# Patient Record
Sex: FEMALE | Race: WHITE | HISPANIC OR LATINO | Employment: STUDENT | URBAN - NONMETROPOLITAN AREA
[De-identification: names, ages, dates, MRNs, and addresses within clinical notes are randomized per-mention and may not be internally consistent; named-entity substitution may affect disease eponyms.]

---

## 2020-08-26 PROCEDURE — U0002 COVID-19 LAB TEST NON-CDC: HCPCS | Performed by: NURSE PRACTITIONER

## 2020-08-26 PROCEDURE — U0004 COV-19 TEST NON-CDC HGH THRU: HCPCS | Performed by: NURSE PRACTITIONER

## 2024-07-22 NOTE — PROGRESS NOTES
"    New Patient Office Visit      Date: 2024  Patient Name: Grace Whittaker  : 2000   MRN: [id number]     Referring Provider: Deena Paulino DO    Chief Complaint:      ICD-10-CM ICD-9-CM   1. ADHD (attention deficit hyperactivity disorder), inattentive type  F90.0 314.00        History of Present Illness:   Grace Whittaker is a 23 y.o. female who is here today to establish care with a psychiatric provider, at the recommendation of her primary care provider.  She states, \"I want to get back on my ADHD medication that I was on when I was a kid.  I guess it works?\"  She was diagnosed with ADHD when she was around 5 years old, and was on medication until middle school; she reports that the medication tasted bad, which was her primary reason for discontinuing it.  She notes that her schoolwork did improve during that time, but she has a poor recollection of her childhood otherwise.  She reports that her father left when she was young, and her mother remained emotionally unavailable, though provided for her daughter's physical needs.  She denies any bullying or physical/sexual trauma, but has had a difficult time for most of her life with interpersonal relationships.  She made it through high school and her bachelor's degree without ADHD medication, but thinks the structure of the educational system worked well for her.  She received accommodations for her ADHD and tutoring, and did fairly well with significant effort.  She did experience increased stress, especially during times or more focus/attention was required, and often had physical reactions to stress in her body; she reports that most semesters, during the weeks leading up to her final examinations, she would miss her period.  Now that she is out of school, she has had a very difficult time establishing herself.  She still has not found a job, and gets very discouraged about this, often feeling like a failure.  She reports low energy, " low motivation, low self-esteem, feelings of hopelessness/worthlessness, and irritability.  She often has vivid dreams, and has frequently used sleep and escape into dreams as her coping mechanism for stress.  Other symptoms include difficulty focusing, procrastination, executive dysfunction, difficulty completing tasks, and periods of hyper fixation. No SI/HI/psychotic/manic symptoms present, no adverse effects or side effects to current medications noted, and no issues with appetite or sleep reported.    Subjective      Review of Systems:   Review of Systems   Psychiatric/Behavioral:  Positive for decreased concentration, sleep disturbance, depressed mood and stress. The patient is nervous/anxious.        Screening Scores:   PHQ-9 : 14  DAVID-7 : 9  PTSD: 31  MDQ: 8  ASRS-V1.1: positive, inattentive    Past Psychiatric History:  History of outpatient psychiatrist: not sure  History of outpatient therapy: no  Previous Inpatient hospitalizations: no  Previous diagnoses: diagnosed with ADHD around age 5  Previous medication trials: ritalin   History of suicide attempts: no  History of self harming behaviors: no     Abuse/trauma History:              Physical: no              Sexual: no              Emotional/Neglect: mother not emotionally available              Death/loss of relationship: dad left when she was young              Other trauma: no                Substance Abuse History:              Alcohol: no              Tobacco/Vape: no              Illicit Drugs: no  Marijuana/THC: no  Hallucinogens: no                Legal History:  The patient has no significant history of legal issues.     Social History:  Where was patient born: [place]  Where does patient currently live: [place]  Describe living situation: live with her mom   Notes: Dad left when she was young  Pets: cat  Highest level of education obtained: bachelors in computer science  Patient's occupation: unemployed, looking for a job in [employer]  analysis  Leisure and recreation: video games, 3D modeling  Support system: don't have anyone I feel like I can talk to; don't make many friends  Confucianism practices: grew up [place], doesn't want to tell her mom she doesn't believe anymore     Family History:  Family History   Problem Relation Age of Onset    Hyperlipidemia Mother     ADD / ADHD Father     Depression Father         N/A    Anxiety disorder Sister     Depression Sister         Had has suicidal thoughts, and struggle with it.    Self-Injurious Behavior  Sister         Would out right hurt herseft, but as a kid she would draw on herself to coup. With here depression and want to harm herself. Also it may have been to have more power or control. (her words not mine)       Family Psychiatric History:   Psych diagnoses: dad had depression and ADHD, sister has depression  Suicide/self harm attempts: no  Substance abuse: Dad's side of family, maternal grandmother     Patient Medical History:  Are there any significant health issues (current or past): no  History of seizures: febrile seizure when she was a baby   History of head injuries: no  History of cardiac issues: no  Medications/supplements: no    Past Medical History:   Diagnosis Date    ADHD (attention deficit hyperactivity disorder) 2001    I hyperfocused as a toddler mother though I was deaf. Didn't start meds until the sec grade, and got off around middle school. At the time I didn't like the bitter pills.    Asthma     Autism spectrum disorder     Could have it.    Depression     Psoriasis     Seizures 2 years old    Cause most likely virus. Never happened again.       Past Surgical History:   Past Surgical History:   Procedure Laterality Date    WISDOM TOOTH EXTRACTION         Medications:     Current Outpatient Medications:     albuterol sulfate  (90 Base) MCG/ACT inhaler, Inhale 2 puffs Every 4 (Four) Hours As Needed for Wheezing or Shortness of Air., Disp: , Rfl:     cholecalciferol  "(Vitamin D) 25 MCG (1000 UT) tablet, Take 1 tablet by mouth Daily., Disp: , Rfl:     Probiotic Product (UP4 PROBIOTICS PO), Take  by mouth., Disp: , Rfl:     methylphenidate (Ritalin) 10 MG tablet, Take 1 tablet by mouth 2 (Two) Times a Day., Disp: 60 tablet, Rfl: 0    Medication Considerations:  ANTHONY reviewed and appropriate.      Allergies:   No Known Allergies    Objective   Vital Signs:   Vitals:    07/23/24 0826   BP: 114/70   Weight: 61.3 kg (135 lb 3.2 oz)   Height: 157.5 cm (62\")     Body mass index is 24.73 kg/m².     Mental Status Exam:   MENTAL STATUS EXAM   General Appearance:  Cleanly groomed and dressed  Eye Contact:  Fair  Attitude:  Guarded and cooperative  Motor Activity:  Normal gait, posture, fidgety and foot tapping  Muscle Strength:  Normal  Speech:  Normal rate, tone, volume and soft spoken  Language:  Hesitant  Mood and affect:  Normal, pleasant, euthymic and depressed  Hopelessness:  2  Loneliness: 2  Thought Process:  Goal-directed and logical  Associations/ Thought Content:  No delusions  Hallucinations:  None  Suicidal Ideations:  Not present  Homicidal Ideation:  Not present  Sensorium:  Alert  Orientation:  Person, place, time and situation  Immediate Recall, Recent, and Remote Memory:  Intact  Attention Span/ Concentration:  Easily distracted  Fund of Knowledge:  Appropriate for age and educational level  Intellectual Functioning:  Average range  Insight:  Good  Judgement:  Good  Reliability:  Good  Impulse Control:  Good       SUICIDE RISK ASSESSMENT/CSSRS:  1. Does patient have thoughts of suicide? no  2. Does patient have intent for suicide? no  3. Does patient have a current plan for suicide? no  4. History of suicide attempts: no  5. Family history of suicide or attempts: no  6. History of violent behaviors towards others or property or thoughts of committing suicide: no  7. History of sexual aggression toward others: no  8. Access to firearms or weapons: no    Labs Reviewed: " n/a  UDS Reviewed: n/a  Chart Reviewed: yes    Assessment / Plan      Quality Measures:   Tobacco cessation: Patient denies tobacco use. No tobacco cessation education necessary.     Depression (PHQ >9): Patient screened positive for depression with a PHQ score of 14. Follow up recommendations include medication management, suicide risk assessment, continued screening score monitoring and supportive care.    Medication Considerations:  Benzo: n/a  Stimulants: CSA up to date and on file   ANTHONY reviewed and appropriate.     Safety: No acute safety concerns    Risk Assessment: Risk of self-harm acutely is low. Risk of self-harm chronically is also low, but could be further elevated in the event of treatment noncompliance and/or AODA.    Visit Diagnosis/Orders Placed This Visit:  Diagnoses and all orders for this visit:    1. ADHD (attention deficit hyperactivity disorder), inattentive type (Primary)  -     methylphenidate (Ritalin) 10 MG tablet; Take 1 tablet by mouth 2 (Two) Times a Day.  Dispense: 60 tablet; Refill: 0         Impression/Formulation:  Patient appeared alert and oriented.  Patient is voluntarily seeking psychiatric care at [facility].  Patient is receptive to assistance with maintaining a stable lifestyle.  Patient presents with history of     ICD-10-CM ICD-9-CM   1. ADHD (attention deficit hyperactivity disorder), inattentive type  F90.0 314.00   .     Treatment Plan:   Start Ritalin 10 mg twice daily for ADHD.  Discussed book recommendations, establishing care with a local therapist, and nonpharmacologic interventions for anxiety. Follow up in 4 weeks.    Any medications prescribed have been sent electronically to [facility] in [place].    Patient will continue supportive psychotherapy efforts and medications as indicated. Discussed medication options and treatment plan of prescribed medication(s) as well as the risks, benefits, and potential side effects. Patient ackowledged  and verbally consented to continue with current treatment plan and was educated on the importance of compliance with treatment and follow-up appointments. Patient seems reasonably able to adhere to treatment plan.      Assisted Patient in identifying risk factors which would indicate the need for higher level of care including thoughts to harm self or others and/or self-harming behavior and encouraged Patient to contact this office, call 911, or present to the nearest emergency room should any of these events occur. Discussed crisis intervention services and means to access. Clinic will obtain release of information for current treatment team for continuity of care as needed. Patient adamantly and convincingly denies current suicidal or homicidal ideation or perceptual disturbance.     Follow Up:   Return in about 4 weeks (around 8/20/2024) for Medication Management.        ANANYA Cruz  [facility]    This is electronically signed by ANANYA Cruz  07/23/2024 12:54 EDT

## 2024-07-23 ENCOUNTER — OFFICE VISIT (OUTPATIENT)
Dept: BEHAVIORAL HEALTH | Facility: CLINIC | Age: 24
End: 2024-07-23
Payer: COMMERCIAL

## 2024-07-23 VITALS
HEIGHT: 62 IN | BODY MASS INDEX: 24.88 KG/M2 | SYSTOLIC BLOOD PRESSURE: 114 MMHG | DIASTOLIC BLOOD PRESSURE: 70 MMHG | WEIGHT: 135.2 LBS

## 2024-07-23 DIAGNOSIS — F90.0 ADHD (ATTENTION DEFICIT HYPERACTIVITY DISORDER), INATTENTIVE TYPE: Primary | ICD-10-CM

## 2024-07-23 PROCEDURE — 90792 PSYCH DIAG EVAL W/MED SRVCS: CPT

## 2024-07-23 RX ORDER — CHOLECALCIFEROL (VITAMIN D3) 25 MCG
1000 TABLET ORAL DAILY
COMMUNITY

## 2024-07-23 RX ORDER — METHYLPHENIDATE HYDROCHLORIDE 10 MG/1
10 TABLET ORAL 2 TIMES DAILY
Qty: 60 TABLET | Refills: 0 | Status: SHIPPED | OUTPATIENT
Start: 2024-07-23

## 2024-07-23 NOTE — PATIENT INSTRUCTIONS
[email or web address]: online therapist directory    Juni recommendations:  Fany and [employer]opla: free library access, including audiobooks and e-books  I Am: affirmations  Balance: mindfulness and meditation    Bilateral stimulation music: free on youtube and spotify    Book Recommendations:  The Radical Woman's Guide to ADHD  You Mean I'm Not Crazy, Lazy, Stupid or Crazy? The Classic Self-Help Book for Adults with ADHD, Fariha  How To Do The Work, Mary Hernández (follow The Holistic Psychologist)  Adult Children of Emotionally Immature Parents, Charla Bolanos

## 2024-07-24 PROBLEM — F90.0 ADHD (ATTENTION DEFICIT HYPERACTIVITY DISORDER), INATTENTIVE TYPE: Status: ACTIVE | Noted: 2024-07-24

## 2024-08-22 ENCOUNTER — OFFICE VISIT (OUTPATIENT)
Dept: BEHAVIORAL HEALTH | Facility: CLINIC | Age: 24
End: 2024-08-22
Payer: COMMERCIAL

## 2024-08-22 VITALS
SYSTOLIC BLOOD PRESSURE: 122 MMHG | BODY MASS INDEX: 24.88 KG/M2 | HEIGHT: 62 IN | WEIGHT: 135.2 LBS | DIASTOLIC BLOOD PRESSURE: 76 MMHG

## 2024-08-22 DIAGNOSIS — F90.0 ADHD (ATTENTION DEFICIT HYPERACTIVITY DISORDER), INATTENTIVE TYPE: Primary | ICD-10-CM

## 2024-08-22 DIAGNOSIS — Z51.81 THERAPEUTIC DRUG MONITORING: ICD-10-CM

## 2024-08-22 RX ORDER — DEXTROAMPHETAMINE SACCHARATE, AMPHETAMINE ASPARTATE, DEXTROAMPHETAMINE SULFATE AND AMPHETAMINE SULFATE 2.5; 2.5; 2.5; 2.5 MG/1; MG/1; MG/1; MG/1
10 TABLET ORAL 2 TIMES DAILY
Qty: 60 TABLET | Refills: 0 | Status: SHIPPED | OUTPATIENT
Start: 2024-08-22

## 2024-08-22 NOTE — PROGRESS NOTES
Follow Up Office Visit      Date: 2024   Patient Name: Grace Whittaker  : 2000   MRN: [id number]     Referring Provider: Deena Paulino DO    Chief Complaint:      ICD-10-CM ICD-9-CM   1. ADHD (attention deficit hyperactivity disorder), inattentive type  F90.0 314.00   2. Therapeutic drug monitoring  Z51.81 V58.83        History of Present Illness:   Grace Whittaker is a 23 y.o. female who is here today for follow up with medication management for ADHD.  She reports that since starting the Ritalin, she feels more energetic, more motivated; she has been able to clean her space, and making decisions and completing tasks has been easier.  However, she also reports feeling shaky and anxious, slightly more fidgety.  She has been taking her medications every 6 hours, and has been pretty consistent, only missing 1 day.  Though she wants to continue treatment for ADHD, she does not want to continue on this particular dose.  No SI/HI/psychotic/manic symptoms present, no adverse effects or side effects to current medications noted, and no issues with appetite or sleep reported.    Answers submitted by the patient for this visit:  Other (Submitted on 8/15/2024)  Please describe your symptoms.: Prescription stuff; When I'm on it I get shaky, also feel anxious. Still do get distracted. But overall it's been an improvement I noticeable one. Also gotten three headaches but it could be because of my menstrual cycle or whatever is causing my rash I don't know. Also sister notice I'm shaky and need to move or do something with my hands more. She also thinks I maybe anxious.  Have you had these symptoms before?: No  How long have you been having these symptoms?: 1-2 weeks  Please list any medications you are currently taking for this condition: Ritalin  Please describe any probable cause for these symptoms: Ritalin, I have found to be vitamin d deficient. I'm working on it there should be an  "improvement (unless am wrong about sunlight). Also my periods just begin weird I am seeing medical professionals about it.  Primary Reason for Visit (Submitted on 8/15/2024)  What is the primary reason for your visit?: Other    Subjective     Review of Systems:   Review of Systems   Psychiatric/Behavioral:  Positive for decreased concentration. The patient is nervous/anxious.        Screening Scores:   PHQ-9 : 9 (last visit, 14)  DAVID-7 : 6 (last visit, 9)    Medications:     Current Outpatient Medications:     albuterol sulfate  (90 Base) MCG/ACT inhaler, Inhale 2 puffs Every 4 (Four) Hours As Needed for Wheezing or Shortness of Air., Disp: , Rfl:     cholecalciferol (Vitamin D) 25 MCG (1000 UT) tablet, Take 1 tablet by mouth Daily., Disp: , Rfl:     Probiotic Product (UP4 PROBIOTICS PO), Take  by mouth., Disp: , Rfl:     amphetamine-dextroamphetamine (Adderall) 10 MG tablet, Take 1 tablet by mouth 2 (Two) Times a Day., Disp: 60 tablet, Rfl: 0    Allergies:   No Known Allergies    Results Reviewed: n/a     The following portion of the patient's history were reviewed and updated appropriately: allergies, current and past medications, family history, medical history and social history.    Objective     Vital Signs:   Vitals:    08/22/24 0830   BP: 122/76   Weight: 61.3 kg (135 lb 3.2 oz)   Height: 157.5 cm (62\")     Body mass index is 24.73 kg/m².     Mental Status Exam:   MENTAL STATUS EXAM   General Appearance:  Cleanly groomed and dressed  Eye Contact:  Good eye contact  Attitude:  Cooperative  Motor Activity:  Normal gait, posture and fidgety  Muscle Strength:  Normal  Speech:  Normal rate, tone, volume  Language:  Spontaneous  Mood and affect:  Normal, pleasant and anxious  Hopelessness:  Denies  Loneliness: Denies  Thought Process:  Logical  Associations/ Thought Content:  No delusions  Hallucinations:  None  Suicidal Ideations:  Not present  Homicidal Ideation:  Not present  Sensorium:  Alert and " clear  Orientation:  Person, place, situation and time  Immediate Recall, Recent, and Remote Memory:  Intact  Attention Span/ Concentration:  Easily distracted  Fund of Knowledge:  Appropriate for age and educational level  Intellectual Functioning:  Average range  Insight:  Good  Judgement:  Good  Reliability:  Good  Impulse Control:  Good        SUICIDE RISK ASSESSMENT/CSSRS:  1. Does patient have thoughts of suicide? no  2. Does patient have intent for suicide? no  3. Does patient have a current plan for suicide? no  4. History of suicide attempts: no  5. Family history of suicide or attempts: no  6. History of violent behaviors towards others or property or thoughts of committing suicide: no  7. History of sexual aggression toward others: no  8. Access to firearms or weapons: no    Labs Reviewed: n/a  UDS Reviewed: ordered today  Chart since last visit reviewed: yes    Assessment / Plan    Quality Measures:  Tobacco cessation: Patient denies tobacco use. No tobacco cessation education necessary.    Depression (PHQ >9): Patient screened positive for depression with a PHQ score of 9. Follow up recommendations include medication management, suicide risk assessment, continued screening score monitoring and supportive care.    Medication Considerations:  Benzo: n/a  Stimulants: CSA up to date and on file   ANTHONY reviewed and appropriate.     Risk Assessment: Risk of self-harm acutely is low. Risk of self-harm chronically is also low, but could be further elevated in the event of treatment noncompliance and/or AODA.    Visit Diagnosis/Orders Placed This Visit:  Diagnoses and all orders for this visit:    1. ADHD (attention deficit hyperactivity disorder), inattentive type (Primary)  -     amphetamine-dextroamphetamine (Adderall) 10 MG tablet; Take 1 tablet by mouth 2 (Two) Times a Day.  Dispense: 60 tablet; Refill: 0    2. Therapeutic drug monitoring  -     Compliance Drug Analysis, Ur - Urine, Clean Catch          Impression/Formulation:  Patient appeared alert and oriented.  Patient is voluntarily continuing to receive psychiatric care at [facility].   Patient is receptive to assistance with maintaining a stable lifestyle.  Patient presents with history of     ICD-10-CM ICD-9-CM   1. ADHD (attention deficit hyperactivity disorder), inattentive type  F90.0 314.00   2. Therapeutic drug monitoring  Z51.81 V58.83   .     Treatment Plan:   Patient has some Ritalin left; advised her to take 5 mg twice daily to see if shaking/anxiety improves. Call if she wishes to continue Ritalin 5 mg twice daily. If not, discontinue Ritalin, start Adderall 10 mg twice daily. Advised patient to take 5 mg twice daily for the first two or three days, then increase to 10 mg. Follow up in 4 weeks.    Any medications prescribed have been sent electronically to [facility] in [place].     Patient will continue supportive psychotherapy efforts and medications as indicated.  Discussed medication options and treatment plan of prescribed medication(s) as well as the risks, benefits, and potential side effects. Patient will contact this office, call 911 or present to the nearest emergency room should suicidal or homicidal ideations occur. Clinic will obtain release of information for current treatment team for continuity of care as needed. Patient ackowledged and verbally consented to continue with current treatment plan and was educated on the importance of compliance with treatment and follow-up appointments.     Follow Up:   Return in about 4 weeks (around 9/19/2024) for Medication Management.        ANANYA Perrin  [facility]    This is electronically signed by ANANYA Perrin  08/22/2024 08:04 EDT

## 2024-08-30 LAB — DRUGS UR: NORMAL

## 2024-09-16 ENCOUNTER — TELEMEDICINE (OUTPATIENT)
Age: 24
End: 2024-09-16
Payer: COMMERCIAL

## 2024-09-16 DIAGNOSIS — F90.0 ADHD (ATTENTION DEFICIT HYPERACTIVITY DISORDER), INATTENTIVE TYPE: Primary | ICD-10-CM

## 2024-09-16 PROCEDURE — 99214 OFFICE O/P EST MOD 30 MIN: CPT

## 2024-09-16 RX ORDER — LISDEXAMFETAMINE DIMESYLATE 20 MG/1
20 CAPSULE ORAL EVERY MORNING
Qty: 30 CAPSULE | Refills: 0 | Status: SHIPPED | OUTPATIENT
Start: 2024-09-16

## 2024-09-20 ENCOUNTER — TELEPHONE (OUTPATIENT)
Dept: INTERNAL MEDICINE | Facility: CLINIC | Age: 24
End: 2024-09-20
Payer: COMMERCIAL

## 2024-09-20 DIAGNOSIS — F90.0 ADHD (ATTENTION DEFICIT HYPERACTIVITY DISORDER), INATTENTIVE TYPE: Primary | ICD-10-CM

## 2024-09-24 RX ORDER — DEXTROAMPHETAMINE SACCHARATE, AMPHETAMINE ASPARTATE, DEXTROAMPHETAMINE SULFATE AND AMPHETAMINE SULFATE 2.5; 2.5; 2.5; 2.5 MG/1; MG/1; MG/1; MG/1
10 TABLET ORAL 2 TIMES DAILY
Qty: 40 TABLET | Refills: 0 | Status: SHIPPED | OUTPATIENT
Start: 2024-09-24 | End: 2024-10-14

## 2024-10-02 PROBLEM — J02.9 SORE THROAT: Status: ACTIVE | Noted: 2024-10-02

## 2024-10-11 ENCOUNTER — OFFICE VISIT (OUTPATIENT)
Dept: INTERNAL MEDICINE | Facility: CLINIC | Age: 24
End: 2024-10-11
Payer: COMMERCIAL

## 2024-10-11 VITALS
BODY MASS INDEX: 23.92 KG/M2 | SYSTOLIC BLOOD PRESSURE: 128 MMHG | TEMPERATURE: 99 F | HEIGHT: 63 IN | DIASTOLIC BLOOD PRESSURE: 74 MMHG | OXYGEN SATURATION: 98 % | HEART RATE: 88 BPM | WEIGHT: 135 LBS

## 2024-10-11 DIAGNOSIS — Z13.0 SCREENING FOR DISORDER OF BLOOD AND BLOOD-FORMING ORGANS: ICD-10-CM

## 2024-10-11 DIAGNOSIS — R25.1 TREMOR: ICD-10-CM

## 2024-10-11 DIAGNOSIS — Z13.228 SCREENING FOR ENDOCRINE, METABOLIC AND IMMUNITY DISORDER: ICD-10-CM

## 2024-10-11 DIAGNOSIS — Z13.0 SCREENING FOR ENDOCRINE, METABOLIC AND IMMUNITY DISORDER: ICD-10-CM

## 2024-10-11 DIAGNOSIS — Z13.1 SCREENING FOR DIABETES MELLITUS: ICD-10-CM

## 2024-10-11 DIAGNOSIS — R53.83 OTHER FATIGUE: ICD-10-CM

## 2024-10-11 DIAGNOSIS — Z83.3 FAMILY HISTORY OF DIABETES MELLITUS: ICD-10-CM

## 2024-10-11 DIAGNOSIS — R59.0 ENLARGED LYMPH NODE IN NECK: ICD-10-CM

## 2024-10-11 DIAGNOSIS — E73.9 LACTOSE INTOLERANCE: ICD-10-CM

## 2024-10-11 DIAGNOSIS — R21 RASH: ICD-10-CM

## 2024-10-11 DIAGNOSIS — Z76.89 ENCOUNTER TO ESTABLISH CARE: Primary | ICD-10-CM

## 2024-10-11 DIAGNOSIS — J02.9 SORE THROAT: ICD-10-CM

## 2024-10-11 DIAGNOSIS — Z13.29 SCREENING FOR ENDOCRINE, METABOLIC AND IMMUNITY DISORDER: ICD-10-CM

## 2024-10-11 PROCEDURE — 99213 OFFICE O/P EST LOW 20 MIN: CPT | Performed by: NURSE PRACTITIONER

## 2024-10-11 NOTE — PROGRESS NOTES
Date: 10/11/2024    Name: Grace Whittaker  : 2000    Chief Complaint:   Chief Complaint   Patient presents with    [facility]     Recent strep infection 10/01/08189       HPI:   is a 23 y.o. female presents to establish care. [facility]    History of Present Illness  Grace Whittaker is a 23-year-old female who presents to [facility]. Was going to [facility] for acute issues.  Last saw her primary care provider in [facility] a few years ago. She had blood work done at [facility] due to a rash, which has since improved. The rash appears in areas where she scratches but can also appear spontaneously. It consists of red dots that generally do not itch, except in areas where she has been bitten by an insect. Her mother suggested it might be damaged cells above the skin, but she is unsure. She has noticed a bump on her left chest that feels unusual but is unsure of its cause. Has been tested for psoriasis.    Endorse occasional shakiness, which seems to lessen after eating. Questions whether this is related to Adderall for ADHD or another factor.  Reports digestive issues and a vitamin D deficiency. Acid reflux symptoms have improved with dietary changes, including increased oatmeal consumption and avoidance of fatty foods. Lactose intolerant and cannot eat eggs. Takes Pepto-Bismol occasionally for severe symptoms but prefers not to take medication frequently.    Tired when not on ADHD medication.Has been experiencing poor sleep recently, which she attributes to hyper-focusing. Experiencing intermittent sore throat pain since her last visit to [facility]. Believes sore throat is due to seasonal allergies. Has a high pain tolerance and rarely needs painkillers. Had wisdom teeth removed without needing painkillers and tested positive for strep throat without needing painkillers.    Reports no hair loss or weight changes.         History:  LMP: Unknown. Typically, 35 days between periods.  Has been having  irregular periods recently.    Menarche: 11 years old  Sexual activity: asexual. Never been sexually active.  Last pap date: never   No family history of breast or cervical cancer     Do you take any herbs or supplements that were not prescribed by a doctor? no      Health Habits:  Dental Exam. not up to date - plans to schedule an appointment  Eye Exam. not up to date - wears glasses  Exercise: 0 times/week.  Current exercise activities include: none  Current diet: typical American diet    History:    Past Medical History:   Diagnosis Date    ADHD (attention deficit hyperactivity disorder) 2001    I hyperfocused as a toddler mother though I was deaf. Didn't start meds until the sec grade, and got off around middle school. At the time I didn't like the bitter pills.    Asthma     Autism spectrum disorder     Could have it.    Depression     Psoriasis     Seizures 2 years old    Cause most likely virus. Never happened again.       Past Surgical History:   Procedure Laterality Date    WISDOM TOOTH EXTRACTION         Family History   Problem Relation Age of Onset    Hyperlipidemia Mother     ADD / ADHD Father     Depression Father         N/A    Anxiety disorder Sister     Depression Sister         Had has suicidal thoughts, and struggle with it.    Self-Injurious Behavior  Sister         Would out right hurt herseft, but as a kid she would draw on herself to coup. With here depression and want to harm herself. Also it may have been to have more power or control. (her words not mine)    Diabetes Maternal Grandfather     Lupus Maternal Uncle        Social History     Socioeconomic History    Marital status: Single   Tobacco Use    Smoking status: Never    Smokeless tobacco: Never   Vaping Use    Vaping status: Never Used   Substance and Sexual Activity    Alcohol use: Never    Drug use: Never    Sexual activity: Never       No Known Allergies      Current Outpatient Medications:     albuterol sulfate  (90 Base)  "MCG/ACT inhaler, Inhale 2 puffs Every 4 (Four) Hours As Needed for Wheezing or Shortness of Air., Disp: , Rfl:     amphetamine-dextroamphetamine (Adderall) 10 MG tablet, Take 1 tablet by mouth 2 (Two) Times a Day for 20 days., Disp: 40 tablet, Rfl: 0    ROS:  Review of Systems    VS:  Vitals:    10/11/24 0838   BP: 128/74   Pulse: 88   Temp: 99 °F (37.2 °C)   SpO2: 98%   Weight: 61.2 kg (135 lb)   Height: 160 cm (62.99\")     Body mass index is 23.92 kg/m².  BMI is within normal parameters. No other follow-up for BMI required.       PE:  Physical Exam  Constitutional:       Appearance: She is not ill-appearing.   HENT:      Head: Normocephalic.      Right Ear: External ear normal.      Left Ear: External ear normal.   Eyes:      Conjunctiva/sclera: Conjunctivae normal.      Pupils: Pupils are equal, round, and reactive to light.   Cardiovascular:      Rate and Rhythm: Normal rate and regular rhythm.      Pulses:           Radial pulses are 2+ on the right side and 2+ on the left side.        Dorsalis pedis pulses are 2+ on the right side and 2+ on the left side.      Heart sounds: Normal heart sounds.   Pulmonary:      Effort: Pulmonary effort is normal.      Breath sounds: Normal breath sounds.   Musculoskeletal:      Cervical back: Normal range of motion and neck supple.      Right lower leg: No edema.      Left lower leg: No edema.   Lymphadenopathy:      Head:      Right side of head: No submental, submandibular, tonsillar, preauricular, posterior auricular or occipital adenopathy.      Left side of head: No submental, submandibular, tonsillar, preauricular, posterior auricular or occipital adenopathy.      Cervical:      Right cervical: No superficial, deep or posterior cervical adenopathy.     Left cervical: Superficial cervical adenopathy present. No deep or posterior cervical adenopathy.      Upper Body:      Right upper body: No supraclavicular adenopathy.      Left upper body: No supraclavicular adenopathy. "   Skin:     General: Skin is warm.      Capillary Refill: Capillary refill takes less than 2 seconds.      Comments: No rash present today   Neurological:      Mental Status: She is alert and oriented to person, place, and time.      Motor: Tremor (mild, when holding hands/arms out) present.      Coordination: Coordination normal.      Gait: Gait normal.   Psychiatric:         Mood and Affect: Mood normal.         Behavior: Behavior normal.         Thought Content: Thought content normal.         Assessment/Plan:         Diagnoses and all orders for this visit:    1. Encounter to establish care (Primary)    2. Tremor  -     T4, Free  -     Thyroid Antibodies  -     TSH    3. Other fatigue  -     CBC & Differential  -     T4, Free  -     Thyroid Antibodies  -     TSH  -     ENRIQUE Profile 11 (RDL)  -     C-reactive Protein  -     Sedimentation rate, automated  -     EBV Antibody Profile    4. Sore throat  -     T4, Free  -     Thyroid Antibodies  -     TSH  -     EBV Antibody Profile    5. Rash  -     ENRIQUE Profile 11 (RDL)  -     C-reactive Protein  -     Sedimentation rate, automated    6. Enlarged lymph node in neck  -     EBV Antibody Profile    7. Lactose intolerance    8. Screening for diabetes mellitus  -     Hemoglobin A1c    9. Family history of diabetes mellitus  -     Hemoglobin A1c    10. Screening for endocrine, metabolic and immunity disorder  -     Comprehensive Metabolic Panel  -     T4, Free  -     Thyroid Antibodies  -     TSH    11. Screening for disorder of blood and blood-forming organs  -     CBC & Differential          Return in about 3 months (around 1/11/2025) for Annual.

## 2024-10-14 ENCOUNTER — TELEMEDICINE (OUTPATIENT)
Age: 24
End: 2024-10-14
Payer: COMMERCIAL

## 2024-10-14 DIAGNOSIS — F90.0 ADHD (ATTENTION DEFICIT HYPERACTIVITY DISORDER), INATTENTIVE TYPE: Primary | ICD-10-CM

## 2024-10-14 LAB
ALBUMIN SERPL-MCNC: 4.5 G/DL (ref 4–5)
ALP SERPL-CCNC: 59 IU/L (ref 44–121)
ALT SERPL-CCNC: 35 IU/L (ref 0–32)
ANA SER QL IF: NORMAL
AST SERPL-CCNC: 25 IU/L (ref 0–40)
BASOPHILS # BLD AUTO: 0.1 X10E3/UL (ref 0–0.2)
BASOPHILS NFR BLD AUTO: 1 %
BILIRUB SERPL-MCNC: 0.2 MG/DL (ref 0–1.2)
BUN SERPL-MCNC: 8 MG/DL (ref 6–20)
BUN/CREAT SERPL: 14 (ref 9–23)
CALCIUM SERPL-MCNC: 9.4 MG/DL (ref 8.7–10.2)
CHLORIDE SERPL-SCNC: 105 MMOL/L (ref 96–106)
CO2 SERPL-SCNC: 21 MMOL/L (ref 20–29)
CREAT SERPL-MCNC: 0.57 MG/DL (ref 0.57–1)
CRP SERPL-MCNC: <1 MG/L (ref 0–10)
EBV NA IGG SER IA-ACNC: 279 U/ML (ref 0–17.9)
EBV VCA IGG SER IA-ACNC: 182 U/ML (ref 0–17.9)
EBV VCA IGM SER IA-ACNC: <36 U/ML (ref 0–35.9)
EGFRCR SERPLBLD CKD-EPI 2021: 129 ML/MIN/1.73
EOSINOPHIL # BLD AUTO: 0.1 X10E3/UL (ref 0–0.4)
EOSINOPHIL NFR BLD AUTO: 1 %
ERYTHROCYTE [DISTWIDTH] IN BLOOD BY AUTOMATED COUNT: 12.9 % (ref 11.7–15.4)
ERYTHROCYTE [SEDIMENTATION RATE] IN BLOOD BY WESTERGREN METHOD: 21 MM/HR (ref 0–32)
GLOBULIN SER CALC-MCNC: 2.8 G/DL (ref 1.5–4.5)
GLUCOSE SERPL-MCNC: 92 MG/DL (ref 70–99)
HBA1C MFR BLD: 5.2 % (ref 4.8–5.6)
HCT VFR BLD AUTO: 43.3 % (ref 34–46.6)
HGB BLD-MCNC: 13.8 G/DL (ref 11.1–15.9)
IMM GRANULOCYTES # BLD AUTO: 0 X10E3/UL (ref 0–0.1)
IMM GRANULOCYTES NFR BLD AUTO: 0 %
LYMPHOCYTES # BLD AUTO: 1.8 X10E3/UL (ref 0.7–3.1)
LYMPHOCYTES NFR BLD AUTO: 25 %
MCH RBC QN AUTO: 28.2 PG (ref 26.6–33)
MCHC RBC AUTO-ENTMCNC: 31.9 G/DL (ref 31.5–35.7)
MCV RBC AUTO: 88 FL (ref 79–97)
MONOCYTES # BLD AUTO: 0.5 X10E3/UL (ref 0.1–0.9)
MONOCYTES NFR BLD AUTO: 7 %
NEUTROPHILS # BLD AUTO: 4.6 X10E3/UL (ref 1.4–7)
NEUTROPHILS NFR BLD AUTO: 66 %
PLATELET # BLD AUTO: 267 X10E3/UL (ref 150–450)
POTASSIUM SERPL-SCNC: 4.1 MMOL/L (ref 3.5–5.2)
PROT SERPL-MCNC: 7.3 G/DL (ref 6–8.5)
RBC # BLD AUTO: 4.9 X10E6/UL (ref 3.77–5.28)
SERVICE CMNT-IMP: ABNORMAL
SODIUM SERPL-SCNC: 141 MMOL/L (ref 134–144)
T4 FREE SERPL-MCNC: 1.4 NG/DL (ref 0.82–1.77)
THYROGLOB AB SERPL-ACNC: <1 IU/ML (ref 0–0.9)
THYROPEROXIDASE AB SERPL-ACNC: <9 IU/ML (ref 0–34)
TSH SERPL DL<=0.005 MIU/L-ACNC: 1.71 UIU/ML (ref 0.45–4.5)
WBC # BLD AUTO: 7 X10E3/UL (ref 3.4–10.8)

## 2024-10-14 PROCEDURE — 99214 OFFICE O/P EST MOD 30 MIN: CPT

## 2024-10-14 RX ORDER — BUPROPION HYDROCHLORIDE 75 MG/1
75 TABLET ORAL DAILY
Qty: 30 TABLET | Refills: 1 | Status: SHIPPED | OUTPATIENT
Start: 2024-10-14

## 2024-10-14 NOTE — PROGRESS NOTES
"     Telehealth E-Visit      Patient Name: Grace Whittaker  : 2000   MRN: [id number]     Chief Complaint:      ICD-10-CM ICD-9-CM   1. ADHD (attention deficit hyperactivity disorder), inattentive type  F90.0 314.00        I have reviewed the E-Visit questionnaire and the patient's answers, my assessment and plan are listed below.     This provider is located at the [facility] (through [facility]), [address] [place] [address] using a secure NextMedium Video Visit through [employer]. Patient is being seen remotely via telehealth at their home address in [place], and stated they are in a secure environment for this session. The patient's condition being diagnosed/treated is appropriate for telemedicine. The provider identified herself as well as her credentials. The patient, and/or patients guardian, consent to be seen remotely, and when consent is given they understand that the consent allows for patient identifiable information to be sent to a third party as needed. They may refuse to be seen remotely at any time. The electronic data is encrypted and password protected, and the patient and/or guardian has been advised of the potential risks to privacy not withstanding such measures.    You have chosen to receive care through a telehealth visit. Do you consent to use a video/audio connection for your medical care today? Yes    History of Present Illness: Grace Whittaker is a 23 y.o. female who is here today to follow up with medication management for ADHD.  At her previous visit, she was prescribed Vyvanse; she did not tolerate the medication well, and was switched back to Adderall 10 twice daily.  She reports that though she felt a little more productive and \"more likely to accomplish the to do list,\" she felt shaky, restless, and \"twitchy\" on both Adderall and Vyvanse.  She reports that she still has issues with procrastination, and occasional mood lability.  No " SI/HI/psychotic/manic symptoms present, and no issues with appetite or sleep reported.     Subjective      I have reviewed and the following portions of the patient's history were updated as appropriate: past family history, past medical history, past social history, past surgical history and problem list.    Medications:     Current Outpatient Medications:     albuterol sulfate  (90 Base) MCG/ACT inhaler, Inhale 2 puffs Every 4 (Four) Hours As Needed for Wheezing or Shortness of Air., Disp: , Rfl:     amphetamine-dextroamphetamine (Adderall) 10 MG tablet, Take 1 tablet by mouth 2 (Two) Times a Day for 20 days., Disp: 40 tablet, Rfl: 0    buPROPion (WELLBUTRIN) 75 MG tablet, Take 1 tablet by mouth Daily., Disp: 30 tablet, Rfl: 1    Allergies:   No Known Allergies    Objective     Physical Exam:  Physical Exam  Psychiatric:         Attention and Perception: Perception normal. She is inattentive.         Mood and Affect: Mood and affect normal.         Speech: Speech normal.         Behavior: Behavior normal. Behavior is cooperative.         Thought Content: Thought content normal.         Cognition and Memory: Cognition and memory normal.         Judgment: Judgment normal.         Mental Status Exam:  MENTAL STATUS EXAM   General Appearance:  Cleanly groomed and dressed  Eye Contact:  Good eye contact  Attitude:  Cooperative  Motor Activity:  Normal gait, posture  Muscle Strength:  Normal  Speech:  Normal rate, tone, volume  Language:  Spontaneous  Mood and affect:  Normal, pleasant and blunted  Hopelessness:  Denies  Loneliness: Denies  Thought Process:  Logical and goal-directed  Associations/ Thought Content:  No delusions  Hallucinations:  None  Suicidal Ideations:  Not present  Homicidal Ideation:  Not present  Sensorium:  Alert and clear  Orientation:  Person, place, time and situation  Immediate Recall, Recent, and Remote Memory:  Intact  Attention Span/ Concentration:  Easily distracted  Fund of  Knowledge:  Appropriate for age and educational level  Intellectual Functioning:  Average range  Insight:  Good  Judgement:  Good  Reliability:  Good  Impulse Control:  Good      Assessment / Plan    Quality Measures:  Tobacco Cessation: Patient denies tobacco use. No tobacco cessation education necessary.     Depression (PHQ >9): Patient screened negative this visit with a PHQ score < 9. Will continue to monitor screening scores and provide supportive care.    Medication education:   Benzo: n/a  Stimulants: CSA up to date and on file     Safety: No acute safety concerns    Risk Assessment: Risk of self-harm acutely is low. Risk of self-harm chronically is also low, but could be further elevated in the event of treatment noncompliance and/or AODA.    20 minutes were spent reviewing the patient's questionnaire, formulating a treatment plan, and relaying information to the patient via [employer].    Assessment/Plan:   Diagnoses and all orders for this visit:    1. ADHD (attention deficit hyperactivity disorder), inattentive type (Primary)  -     buPROPion (WELLBUTRIN) 75 MG tablet; Take 1 tablet by mouth Daily.  Dispense: 30 tablet; Refill: 1         Impression/Formulation:  Patient appeared alert and oriented.  Patient is voluntarily contiuing psychiatric care at [facility].  Patient is receptive to assistance with maintaining a stable lifestyle.  Patient presents with history of     ICD-10-CM ICD-9-CM   1. ADHD (attention deficit hyperactivity disorder), inattentive type  F90.0 314.00   .     Treatment Plan:   Given patient's sensitivity to stimulant medications, we may need to utilize nonstimulant medications.  Discontinue Adderall.  Start Wellbutrin 75 mg daily.  Will increase to 150 mg at next visit, if well tolerated.  Follow-up in 4 weeks.    Any medications prescribed have been sent electronically to [facility] in [place].    Patient will continue supportive psychotherapy efforts and  medications as indicated. Discussed medication options and treatment plan of prescribed medication(s) as well as the risks, benefits, and potential side effects. Patient ackowledged and verbally consented to continue with current treatment plan and was educated on the importance of compliance with treatment and follow-up appointments. Patient seems reasonably able to adhere to treatment plan.      Assisted Patient in identifying risk factors which would indicate the need for higher level of care including thoughts to harm self or others and/or self-harming behavior and encouraged Patient to contact this office, call 911, or present to the nearest emergency room should any of these events occur. Discussed crisis intervention services and means to access. Clinic will obtain release of information for current treatment team for continuity of care as needed. Patient adamantly and convincingly denies current suicidal or homicidal ideation or perceptual disturbance.       Follow Up:   Return in about 4 weeks (around 11/11/2024) for Medication Management.        ANANYA Cruz  [facility]    This is electronically signed by ANANYA Cruz  10/14/2024 08:21 EDT

## 2024-10-25 LAB
ALBUMIN SERPL-MCNC: 4.5 G/DL (ref 4–5)
ALP SERPL-CCNC: 59 IU/L (ref 44–121)
ALT SERPL-CCNC: 35 IU/L (ref 0–32)
ANA PROFILE 11 EIA INTERP: ABNORMAL
ANA SER QL IF: POSITIVE
ANA SPECKLED TITR SER: ABNORMAL {TITER}
AST SERPL-CCNC: 25 IU/L (ref 0–40)
BASOPHILS # BLD AUTO: 0.1 X10E3/UL (ref 0–0.2)
BASOPHILS NFR BLD AUTO: 1 %
BILIRUB SERPL-MCNC: 0.2 MG/DL (ref 0–1.2)
BUN SERPL-MCNC: 8 MG/DL (ref 6–20)
BUN/CREAT SERPL: 14 (ref 9–23)
C3 SERPL-MCNC: 174 MG/DL (ref 82–167)
C4 SERPL-MCNC: 29 MG/DL (ref 14–44)
CALCIUM SERPL-MCNC: 9.4 MG/DL (ref 8.7–10.2)
CENTROMERE AB TITR SER IF: ABNORMAL {TITER}
CHLORIDE SERPL-SCNC: 105 MMOL/L (ref 96–106)
CO2 SERPL-SCNC: 21 MMOL/L (ref 20–29)
CREAT SERPL-MCNC: 0.57 MG/DL (ref 0.57–1)
CRP SERPL-MCNC: <1 MG/L (ref 0–10)
DSDNA AB SER FARR-ACNC: <8 IU/ML
EBV NA IGG SER IA-ACNC: 279 U/ML (ref 0–17.9)
EBV VCA IGG SER IA-ACNC: 182 U/ML (ref 0–17.9)
EBV VCA IGM SER IA-ACNC: <36 U/ML (ref 0–35.9)
EGFRCR SERPLBLD CKD-EPI 2021: 129 ML/MIN/1.73
ENA SCL70 AB SER IA-ACNC: <20 UNITS
ENA SM AB SER-ACNC: <20 UNITS
ENA SS-A AB SER IA-ACNC: <20 UNITS
ENA SS-B AB SER IA-ACNC: <20 UNITS
EOSINOPHIL # BLD AUTO: 0.1 X10E3/UL (ref 0–0.4)
EOSINOPHIL NFR BLD AUTO: 1 %
ERYTHROCYTE [DISTWIDTH] IN BLOOD BY AUTOMATED COUNT: 12.9 % (ref 11.7–15.4)
ERYTHROCYTE [SEDIMENTATION RATE] IN BLOOD BY WESTERGREN METHOD: 21 MM/HR (ref 0–32)
GLOBULIN SER CALC-MCNC: 2.8 G/DL (ref 1.5–4.5)
GLUCOSE SERPL-MCNC: 92 MG/DL (ref 70–99)
HBA1C MFR BLD: 5.2 % (ref 4.8–5.6)
HCT VFR BLD AUTO: 43.3 % (ref 34–46.6)
HGB BLD-MCNC: 13.8 G/DL (ref 11.1–15.9)
IMM GRANULOCYTES # BLD AUTO: 0 X10E3/UL (ref 0–0.1)
IMM GRANULOCYTES NFR BLD AUTO: 0 %
LABORATORY COMMENT REPORT: ABNORMAL
LYMPHOCYTES # BLD AUTO: 1.8 X10E3/UL (ref 0.7–3.1)
LYMPHOCYTES NFR BLD AUTO: 25 %
MCH RBC QN AUTO: 28.2 PG (ref 26.6–33)
MCHC RBC AUTO-ENTMCNC: 31.9 G/DL (ref 31.5–35.7)
MCV RBC AUTO: 88 FL (ref 79–97)
MONOCYTES # BLD AUTO: 0.5 X10E3/UL (ref 0.1–0.9)
MONOCYTES NFR BLD AUTO: 7 %
NEUTROPHILS # BLD AUTO: 4.6 X10E3/UL (ref 1.4–7)
NEUTROPHILS NFR BLD AUTO: 66 %
PLATELET # BLD AUTO: 267 X10E3/UL (ref 150–450)
POTASSIUM SERPL-SCNC: 4.1 MMOL/L (ref 3.5–5.2)
PROT SERPL-MCNC: 7.3 G/DL (ref 6–8.5)
RBC # BLD AUTO: 4.9 X10E6/UL (ref 3.77–5.28)
SERVICE CMNT-IMP: ABNORMAL
SODIUM SERPL-SCNC: 141 MMOL/L (ref 134–144)
T4 FREE SERPL-MCNC: 1.4 NG/DL (ref 0.82–1.77)
THYROGLOB AB SERPL-ACNC: <1 IU/ML (ref 0–0.9)
THYROPEROXIDASE AB SERPL-ACNC: <9 IU/ML (ref 0–34)
TSH SERPL DL<=0.005 MIU/L-ACNC: 1.71 UIU/ML (ref 0.45–4.5)
U1 SNRNP AB SER IA-ACNC: <20 UNITS
WBC # BLD AUTO: 7 X10E3/UL (ref 3.4–10.8)

## 2024-11-12 ENCOUNTER — TELEMEDICINE (OUTPATIENT)
Dept: BEHAVIORAL HEALTH | Facility: CLINIC | Age: 24
End: 2024-11-12
Payer: COMMERCIAL

## 2024-11-12 DIAGNOSIS — F90.0 ADHD (ATTENTION DEFICIT HYPERACTIVITY DISORDER), INATTENTIVE TYPE: Primary | ICD-10-CM

## 2024-11-12 RX ORDER — BUPROPION HYDROCHLORIDE 150 MG/1
150 TABLET ORAL EVERY MORNING
Qty: 30 TABLET | Refills: 2 | Status: SHIPPED | OUTPATIENT
Start: 2024-11-12

## 2024-11-12 NOTE — PROGRESS NOTES
"     Telehealth E-Visit      Patient Name: Grace Whittaker  : 2000   MRN: [id number]     Chief Complaint:      ICD-10-CM ICD-9-CM   1. ADHD (attention deficit hyperactivity disorder), inattentive type  F90.0 314.00        I have reviewed the E-Visit questionnaire and the patient's answers, my assessment and plan are listed below.     Mode of Visit: Video  Location of patient: -HOME-  Location of provider: +[facility]  You have chosen to receive care through a telehealth visit.  The patient has signed the video visit consent form.  The visit included audio and video interaction. No technical issues occurred during this visit.    This provider is located at the [facility] (through [facility]), [address] [place] [address] using a secure Weblance Video Visit through [employer]. Patient is being seen remotely via telehealth at their home address in [place], and stated they are in a secure environment for this session. The patient's condition being diagnosed/treated is appropriate for telemedicine. The provider identified herself as well as her credentials. The patient, and/or patients guardian, consent to be seen remotely, and when consent is given they understand that the consent allows for patient identifiable information to be sent to a third party as needed. They may refuse to be seen remotely at any time. The electronic data is encrypted and password protected, and the patient and/or guardian has been advised of the potential risks to privacy not withstanding such measures.    You have chosen to receive care through a telehealth visit. Do you consent to use a video/audio connection for your medical care today? Yes    History of Present Illness: Grace Whittaker is a 23 y.o. female who is here today to follow up with medication management for ADHD.  She reports that she is doing fairly well, and the Wellbutrin seems to be helping.  She states, \"I think it is okay.  I am " "having some stomach issues, but I think they started after I was on some antibiotics.\"  Her focus is mildly improved, but there is plenty of room for improvement.  She still has a difficult time initiating tasks and staying on task, but seems less anxious about the situation in general.  No SI/HI/psychotic/manic symptoms present, no adverse effects or side effects to current medications noted, and no issues with appetite or sleep reported.     Subjective      I have reviewed and the following portions of the patient's history were updated as appropriate: past family history, past medical history, past social history, past surgical history and problem list.    Medications:     Current Outpatient Medications:     albuterol sulfate  (90 Base) MCG/ACT inhaler, Inhale 2 puffs Every 4 (Four) Hours As Needed for Wheezing or Shortness of Air., Disp: , Rfl:     buPROPion XL (Wellbutrin XL) 150 MG 24 hr tablet, Take 1 tablet by mouth Every Morning., Disp: 30 tablet, Rfl: 2    Allergies:   No Known Allergies    Objective     Physical Exam:  Physical Exam  Psychiatric:         Attention and Perception: Perception normal. She is inattentive.         Mood and Affect: Mood normal.         Speech: Speech normal.         Behavior: Behavior normal.         Thought Content: Thought content normal.         Cognition and Memory: Cognition normal.         Judgment: Judgment is impulsive.         Mental Status Exam:  MENTAL STATUS EXAM   General Appearance:  Cleanly groomed and dressed  Eye Contact:  Good eye contact  Attitude:  Cooperative  Motor Activity:  Normal gait, posture and fidgety  Muscle Strength:  Normal  Speech:  Normal rate, tone, volume  Language:  Spontaneous  Mood and affect:  Normal, pleasant and blunted  Hopelessness:  Denies  Loneliness: Denies  Thought Process:  Logical  Associations/ Thought Content:  No delusions  Hallucinations:  None  Suicidal Ideations:  Not present  Homicidal Ideation:  Not " present  Sensorium:  Alert and clear  Orientation:  Person, place, time and situation  Immediate Recall, Recent, and Remote Memory:  Intact  Attention Span/ Concentration:  Easily distracted  Fund of Knowledge:  Appropriate for age and educational level  Intellectual Functioning:  Average range  Insight:  Good  Judgement:  Good  Reliability:  Good  Impulse Control:  Fair      Assessment / Plan    Quality Measures:  Tobacco Cessation: Patient denies tobacco use. No tobacco cessation education necessary.    Depression (PHQ >9): Patient screened negative this visit with a PHQ score < 9. Will continue to monitor screening scores and provide supportive care.    Medication education:   Benzo: n/a  Stimulants: CSA up to date and on file     Safety: No acute safety concerns    Risk Assessment: Risk of self-harm acutely is low. Risk of self-harm chronically is also low, but could be further elevated in the event of treatment noncompliance and/or AODA.    15 minutes were spent reviewing the patient's questionnaire, formulating a treatment plan, and relaying information to the patient via Gen[employer].    Assessment/Plan:   Diagnoses and all orders for this visit:    1. ADHD (attention deficit hyperactivity disorder), inattentive type (Primary)  -     buPROPion XL (Wellbutrin XL) 150 MG 24 hr tablet; Take 1 tablet by mouth Every Morning.  Dispense: 30 tablet; Refill: 2         Impression/Formulation:  Patient appeared alert and oriented.  Patient is voluntarily contiuing psychiatric care at [facility].  Patient is receptive to assistance with maintaining a stable lifestyle.  Patient presents with history of     ICD-10-CM ICD-9-CM   1. ADHD (attention deficit hyperactivity disorder), inattentive type  F90.0 314.00   .     Treatment Plan:   Increase Wellbutrin XL to 150 mg daily.  Follow up in 3 weeks.    Any medications prescribed have been sent electronically to [facility].     Patient will continue  supportive psychotherapy efforts and medications as indicated. Discussed medication options and treatment plan of prescribed medication(s) as well as the risks, benefits, and potential side effects. Patient ackowledged and verbally consented to continue with current treatment plan and was educated on the importance of compliance with treatment and follow-up appointments. Patient seems reasonably able to adhere to treatment plan.      Assisted Patient in identifying risk factors which would indicate the need for higher level of care including thoughts to harm self or others and/or self-harming behavior and encouraged Patient to contact this office, call 911, or present to the nearest emergency room should any of these events occur. Discussed crisis intervention services and means to access. Clinic will obtain release of information for current treatment team for continuity of care as needed. Patient adamantly and convincingly denies current suicidal or homicidal ideation or perceptual disturbance.       Follow Up:   Return in about 3 weeks (around 12/3/2024) for Medication Management.        ANANYA Cruz  [facility]    This is electronically signed by ANANYA Cruz  11/12/2024 16:45 EST

## 2024-12-02 ENCOUNTER — TELEMEDICINE (OUTPATIENT)
Age: 24
End: 2024-12-02
Payer: COMMERCIAL

## 2024-12-02 DIAGNOSIS — F90.0 ADHD (ATTENTION DEFICIT HYPERACTIVITY DISORDER), INATTENTIVE TYPE: Primary | ICD-10-CM

## 2024-12-02 PROCEDURE — 99214 OFFICE O/P EST MOD 30 MIN: CPT

## 2024-12-02 RX ORDER — ATOMOXETINE 25 MG/1
25 CAPSULE ORAL DAILY
Qty: 30 CAPSULE | Refills: 2 | Status: SHIPPED | OUTPATIENT
Start: 2024-12-02

## 2024-12-02 NOTE — PROGRESS NOTES
Telehealth E-Visit      Patient Name: Grace Whittaker  : 2000   MRN: [id number]     Chief Complaint:      ICD-10-CM ICD-9-CM   1. ADHD (attention deficit hyperactivity disorder), inattentive type  F90.0 314.00        I have reviewed the E-Visit questionnaire and the patient's answers, my assessment and plan are listed below.     Mode of Visit: Video  Location of patient: -HOME-  Location of provider: [facility]  You have chosen to receive care through a telehealth visit.  The patient has signed the video visit consent form.  The visit included audio and video interaction. No technical issues occurred during this visit.    This provider is located at the [facility] (through [facility]), [address] [place] [address] using a secure Live[employer] Video Visit through [email or web address]. Patient is being seen remotely via telehealth from a secure environment in [place]. The patient's condition being diagnosed/treated is appropriate for telemedicine. The provider identified herself as well as her credentials. The patient, and/or patients guardian, consent to be seen remotely, and when consent is given they understand that the consent allows for patient identifiable information to be sent to a third party as needed. They may refuse to be seen remotely at any time. The electronic data is encrypted and password protected, and the patient and/or guardian has been advised of the potential risks to privacy not withstanding such measures.    You have chosen to receive care through a telehealth visit. Do you consent to use a video/audio connection for your medical care today? Yes    History of Present Illness: Grace Whittaker is a 23 y.o. female who is here today to follow up with medication management for ADHD. She has been taking the increased dose of Wellbutrin for around 2 weeks, but started having heart palpitations at the end of last week.  She has discontinued the medication, and  "does not want to stay on it anymore. \"I didn't notice much difference anyway,\" she states.  She continues to have intermittent nausea and heartburn, and has been more tired lately.  No SI/HI/psychotic/manic symptoms present, no current medications noted, and no issues with appetite or sleep reported.     Subjective      I have reviewed and the following portions of the patient's history were updated as appropriate: past family history, past medical history, past social history, past surgical history and problem list.    Medications:     Current Outpatient Medications:     albuterol sulfate  (90 Base) MCG/ACT inhaler, Inhale 2 puffs Every 4 (Four) Hours As Needed for Wheezing or Shortness of Air., Disp: , Rfl:     atomoxetine (Strattera) 25 MG capsule, Take 1 capsule by mouth Daily., Disp: 30 capsule, Rfl: 2    Allergies:   No Known Allergies    Objective     Physical Exam:  Physical Exam  Psychiatric:         Attention and Perception: Perception normal. She is inattentive.         Mood and Affect: Mood normal.         Speech: Speech normal.         Behavior: Behavior normal.         Thought Content: Thought content normal.         Cognition and Memory: Cognition normal.         Judgment: Judgment normal.         Mental Status Exam:  MENTAL STATUS EXAM   General Appearance:  Cleanly groomed and dressed  Eye Contact:  Good eye contact  Attitude:  Cooperative and polite  Motor Activity:  Normal gait, posture  Muscle Strength:  Normal  Speech:  Normal rate, tone, volume  Language:  Spontaneous and hesitant  Mood and affect:  Normal, pleasant, euthymic and blunted  Hopelessness:  Denies  Loneliness: Denies  Thought Process:  Logical  Associations/ Thought Content:  No delusions  Hallucinations:  None  Suicidal Ideations:  Not present  Homicidal Ideation:  Not present  Sensorium:  Alert and clear  Orientation:  Person, place, time and situation  Immediate Recall, Recent, and Remote Memory:  Intact  Attention Span/ " Concentration:  Easily distracted  Fund of Knowledge:  Appropriate for age and educational level  Intellectual Functioning:  Average range  Insight:  Good  Judgement:  Good  Reliability:  Good  Impulse Control:  Good      Assessment / Plan    Quality Measures:  Tobacco Cessation: Patient denies tobacco use. No tobacco cessation education necessary.     Depression (PHQ >9): Patient screened negative this visit with a PHQ score < 9. Will continue to monitor screening scores and provide supportive care.    Medication education:   Benzo: n/a  Stimulants: CSA up to date and on file     Safety: No acute safety concerns    Risk Assessment: Risk of self-harm acutely is low. Risk of self-harm chronically is also low, but could be further elevated in the event of treatment noncompliance and/or AODA.    15 minutes were spent reviewing the patient's questionnaire, formulating a treatment plan, and relaying information to the patient via [employer].    Assessment/Plan:   Diagnoses and all orders for this visit:    1. ADHD (attention deficit hyperactivity disorder), inattentive type (Primary)  -     atomoxetine (Strattera) 25 MG capsule; Take 1 capsule by mouth Daily.  Dispense: 30 capsule; Refill: 2         Impression/Formulation:  Patient appeared alert and oriented.  Patient is voluntarily contiuing psychiatric care at [facility].  Patient is receptive to assistance with maintaining a stable lifestyle.  Patient presents with history of     ICD-10-CM ICD-9-CM   1. ADHD (attention deficit hyperactivity disorder), inattentive type  F90.0 314.00   .     Treatment Plan:   Discontinue Wellbutrin.  Start Strattera 25 mg daily.  Follow up in 6 weeks.    Any medications prescribed have been sent [facility] to [facility] in [place].     Patient will continue supportive psychotherapy efforts and medications as indicated. Discussed medication options and treatment plan of prescribed medication(s) as well as the  risks, benefits, and potential side effects. Patient ackowledged and verbally consented to continue with current treatment plan and was educated on the importance of compliance with treatment and follow-up appointments. Patient seems reasonably able to adhere to treatment plan.      Assisted Patient in identifying risk factors which would indicate the need for higher level of care including thoughts to harm self or others and/or self-harming behavior and encouraged Patient to contact this office, call 911, or present to the nearest emergency room should any of these events occur. Discussed crisis intervention services and means to access. Clinic will obtain release of information for current treatment team for continuity of care as needed. Patient adamantly and convincingly denies current suicidal or homicidal ideation or perceptual disturbance.       Follow Up:   Return in about 6 weeks (around 1/13/2025) for Medication Management.        ANANYA Cruz  [facility]    This is electronically signed by ANANYA Cruz  12/02/2024 08:15 EST

## 2025-01-13 ENCOUNTER — TELEMEDICINE (OUTPATIENT)
Age: 25
End: 2025-01-13
Payer: COMMERCIAL

## 2025-01-13 DIAGNOSIS — F51.04 PSYCHOPHYSIOLOGICAL INSOMNIA: ICD-10-CM

## 2025-01-13 DIAGNOSIS — F90.0 ADHD (ATTENTION DEFICIT HYPERACTIVITY DISORDER), INATTENTIVE TYPE: Primary | ICD-10-CM

## 2025-01-13 PROCEDURE — 99214 OFFICE O/P EST MOD 30 MIN: CPT

## 2025-01-13 PROCEDURE — 96127 BRIEF EMOTIONAL/BEHAV ASSMT: CPT

## 2025-01-13 RX ORDER — HYDROXYZINE HYDROCHLORIDE 10 MG/1
10 TABLET, FILM COATED ORAL 3 TIMES DAILY PRN
Qty: 90 TABLET | Refills: 0 | Status: SHIPPED | OUTPATIENT
Start: 2025-01-13

## 2025-01-13 RX ORDER — ATOMOXETINE 40 MG/1
40 CAPSULE ORAL DAILY
Qty: 30 CAPSULE | Refills: 2 | Status: SHIPPED | OUTPATIENT
Start: 2025-01-13

## 2025-01-13 NOTE — PROGRESS NOTES
"     Telehealth E-Visit      Patient Name: Grace Whittaker  : 2000   MRN: [id number]     Chief Complaint:      ICD-10-CM ICD-9-CM   1. ADHD (attention deficit hyperactivity disorder), inattentive type  F90.0 314.00   2. Psychophysiological insomnia  F51.04 307.42        I have reviewed the E-Visit questionnaire and the patient's answers, my assessment and plan are listed below.     Mode of Visit: Video  Location of patient: -HOME-  Location of provider: [facility]  You have chosen to receive care through a telehealth visit.  The patient has signed the video visit consent form.  The visit included audio and video interaction. No technical issues occurred during this visit.    This provider is located at the [facility] ([facility]), [address] [place] [address] using a secure Floor[employer] Video Visit through [employer]. Patient is being seen remotely via telehealth from a secure environment in [place]. The patient's condition being diagnosed/treated is appropriate for telemedicine. The provider identified herself as well as her credentials. The patient, and/or patients guardian, consent to be seen remotely, and when consent is given they understand that the consent allows for patient identifiable information to be sent to a third party as needed. They may refuse to be seen remotely at any time. The electronic data is encrypted and password protected, and the patient and/or guardian has been advised of the potential risks to privacy not withstanding such measures.    You have chosen to receive care through a telehealth visit. Do you consent to use a video/audio connection for your medical care today? Yes    History of Present Illness: Grace Whittaker is a 24 y.o. female who is here today to follow up with medication management for ADHD.  She reports that she is doing fairly well today, and is not having any side effects from the Strattera.  She states, \"I feel like " "I am not on any medication at all, but it does not seem to be hurting anything either.\"  She reports occasional difficulty sleeping, having a difficult time falling asleep and staying asleep; this happens 1-2 times a month.  No SI/HI/psychotic/manic symptoms present, no adverse effects or side effects to current medications noted, and no issues with appetite reported.     Subjective      Review of Systems:   Review of Systems   Psychiatric/Behavioral:  Positive for decreased concentration. The patient is nervous/anxious.        Medications:     Current Outpatient Medications:     albuterol sulfate  (90 Base) MCG/ACT inhaler, Inhale 2 puffs Every 4 (Four) Hours As Needed for Wheezing or Shortness of Air., Disp: , Rfl:     atomoxetine (Strattera) 40 MG capsule, Take 1 capsule by mouth Daily., Disp: 30 capsule, Rfl: 2    hydrOXYzine (ATARAX) 10 MG tablet, Take 1 tablet by mouth 3 (Three) Times a Day As Needed for Anxiety (insomnia)., Disp: 90 tablet, Rfl: 0    Allergies:   No Known Allergies    Results Reviewed: n/a     The following portion of the patient's history were reviewed and updated appropriately: allergies, current and past medications, family history, medical history and social history.    Objective     Mental Status Exam:  MENTAL STATUS EXAM   General Appearance:  Cleanly groomed and dressed  Eye Contact:  Good eye contact  Attitude:  Cooperative  Motor Activity:  Normal gait, posture and fidgety  Muscle Strength:  Normal  Speech:  Normal rate, tone, volume and soft spoken  Language:  Spontaneous  Mood and affect:  Normal, pleasant  Hopelessness:  Denies  Loneliness: Denies  Thought Process:  Logical  Associations/ Thought Content:  No delusions  Hallucinations:  None  Suicidal Ideations:  Not present  Homicidal Ideation:  Not present  Sensorium:  Alert and clear  Orientation:  Person, place, time and situation  Immediate Recall, Recent, and Remote Memory:  Intact  Attention Span/ Concentration:  " Easily distracted  Fund of Knowledge:  Appropriate for age and educational level  Intellectual Functioning:  Average range  Insight:  Good  Judgement:  Good  Reliability:  Good  Impulse Control:  Good      SUICIDE RISK ASSESSMENT/CSSRS:  1. Does patient have thoughts of suicide? no  2. Does patient have intent for suicide? no  3. Does patient have a current plan for suicide? no  4. History of suicide attempts: no  5. Family history of suicide or attempts: no  6. History of violent behaviors towards others or property or thoughts of committing suicide: no  7. History of sexual aggression toward others: no  8. Access to firearms or weapons: no    Labs Reviewed: n/a  UDS Reviewed: n/a  Chart since last visit reviewed: yes    Assessment / Plan    Quality Measures:  Tobacco Cessation: Patient denies tobacco use. No tobacco cessation education necessary.       Depression (PHQ >9): Patient screened negative this visit with a PHQ score < 9. Will continue to monitor screening scores and provide supportive care.     Medication education:   Benzo: N/A  Stimulants: CSA up to date and on file      Risk Assessment: Risk of self-harm acutely is low. Risk of self-harm chronically is also low, but could be further elevated in the event of treatment noncompliance and/or AODA.    20 minutes were spent reviewing the patient's questionnaire, formulating a treatment plan, and relaying information to the patient via [employer].    Assessment/Plan:   Diagnoses and all orders for this visit:    1. ADHD (attention deficit hyperactivity disorder), inattentive type (Primary)  -     atomoxetine (Strattera) 40 MG capsule; Take 1 capsule by mouth Daily.  Dispense: 30 capsule; Refill: 2    2. Psychophysiological insomnia  -     hydrOXYzine (ATARAX) 10 MG tablet; Take 1 tablet by mouth 3 (Three) Times a Day As Needed for Anxiety (insomnia).  Dispense: 90 tablet; Refill: 0         Impression/Formulation:  Patient appeared alert and oriented.  Patient  is voluntarily contiuing psychiatric care at [facility]  Patient is receptive to assistance with maintaining a stable lifestyle.  Patient presents with history of     ICD-10-CM ICD-9-CM   1. ADHD (attention deficit hyperactivity disorder), inattentive type  F90.0 314.00   2. Psychophysiological insomnia  F51.04 307.42   .     Treatment Plan:   Increase Strattera to 40 mg daily.  Start hydroxyzine 10 mg up to 3 times daily as needed for anxiety and insomnia.  Follow-up in 5 weeks.    Any medications prescribed have been sent electronically to [facility] in [place].    Patient will continue supportive psychotherapy efforts and medications as indicated. Discussed medication options and treatment plan of prescribed medication(s) as well as the risks, benefits, and potential side effects. Patient ackowledged and verbally consented to continue with current treatment plan and was educated on the importance of compliance with treatment and follow-up appointments. Patient seems reasonably able to adhere to treatment plan.      Assisted Patient in identifying risk factors which would indicate the need for higher level of care including thoughts to harm self or others and/or self-harming behavior and encouraged Patient to contact this office, call 911, or present to the nearest emergency room should any of these events occur. Discussed crisis intervention services and means to access. Clinic will obtain release of information for current treatment team for continuity of care as needed. Patient adamantly and convincingly denies current suicidal or homicidal ideation or perceptual disturbance.       Follow Up:   Return in about 5 weeks (around 2/17/2025) for Medication Management.        ANANYA Cruz  [facility]    This is electronically signed by ANANYA Cruz  01/13/2025 09:08 EST

## 2025-02-17 ENCOUNTER — TELEMEDICINE (OUTPATIENT)
Age: 25
End: 2025-02-17

## 2025-02-17 DIAGNOSIS — F90.0 ADHD (ATTENTION DEFICIT HYPERACTIVITY DISORDER), INATTENTIVE TYPE: Primary | ICD-10-CM

## 2025-02-17 PROCEDURE — 99214 OFFICE O/P EST MOD 30 MIN: CPT

## 2025-02-17 PROCEDURE — 96127 BRIEF EMOTIONAL/BEHAV ASSMT: CPT

## 2025-02-17 RX ORDER — VILOXAZINE HYDROCHLORIDE 100 MG/1
100 CAPSULE, EXTENDED RELEASE ORAL DAILY
Qty: 14 CAPSULE | Refills: 0 | COMMUNITY
Start: 2025-02-17

## 2025-02-17 RX ORDER — VILOXAZINE HYDROCHLORIDE 100 MG/1
100 CAPSULE, EXTENDED RELEASE ORAL DAILY
Qty: 30 CAPSULE | Refills: 2 | Status: SHIPPED | OUTPATIENT
Start: 2025-02-17

## 2025-02-17 NOTE — PATIENT INSTRUCTIONS
Book Recommendations  ADHD 2.0, Maya and Annamaria  ADHD for Smart A** Women, Teressa Pagan (also a podcast)

## 2025-02-17 NOTE — PROGRESS NOTES
Telehealth E-Visit      Patient Name: Grace Whittaker  : 2000   MRN: [id number]     Chief Complaint:  Grace Whittaker is a 24 y.o. female who is here today to follow up with medication management for ADHD.    Patient or patient representative verbalized consent for the use of Ambient Listening during the visit with  ANANYA Cruz for chart documentation. 2025  09:29 EST    I have reviewed the E-Visit questionnaire and the patient's answers, my assessment and plan are listed below.     Mode of Visit: Video  Location of patient: -HOME-  Location of provider: +[facility]+  You have chosen to receive care through a telehealth visit.  The patient has signed the video visit consent form.  The visit included audio and video interaction. No technical issues occurred during this visit.    This provider is located at the [facility] (through [facility]), [address] [place] [address] using a secure [employer] Video Visit through [employer]. Patient is being seen remotely via telehealth from a secure environment in [place]. The patient's condition being diagnosed/treated is appropriate for telemedicine. The provider identified herself as well as her credentials. The patient, and/or patients guardian, consent to be seen remotely, and when consent is given they understand that the consent allows for patient identifiable information to be sent to a third party as needed. They may refuse to be seen remotely at any time. The electronic data is encrypted and password protected, and the patient and/or guardian has been advised of the potential risks to privacy not withstanding such measures.    You have chosen to receive care through a telehealth visit. Do you consent to use a video/audio connection for your medical care today? Yes  History of Present Illness  The patient is a 24-year-old female who presents via virtual visit for follow-up of medication management.    She  reports an initial improvement in her condition following a reduction in her Strattera dosage from 40 mg to 25 mg. However, she subsequently experienced nausea and excessive sleepiness, necessitating daytime naps despite adequate nighttime sleep. She also noted a transient increase in productivity during the first week of treatment, but this was followed by a decline characterized by fatigue and increased thirst. She has been on the reduced dosage for nearly a week, starting on Wednesday, and reports a return to normalcy with fewer naps. She expresses uncertainty about the efficacy of the medication, attributing her current fatigue to late-night reading. She also reports experiencing acid reflux at night, which she believes may be related to her medication, and a sore throat due to not using her wedge pillow. Additionally, she mentions an unusual gag reflex triggered by the medication, which is mitigated when taken with food. She expresses a desire to discontinue the medication due to persistent nausea and is unsure if the fatigue will resolve. She reports difficulty sleeping and irregular sleep patterns, often feeling tired but unable to sleep, followed by periods of wakefulness and subsequent exhaustion.     MEDICATIONS  Current: Strattera  Past: Wellbutrin, ritalin, vyvanse, adderall    Subjective      Review of Systems:   Review of Systems   Psychiatric/Behavioral:  Positive for decreased concentration and sleep disturbance.        Medications:     Current Outpatient Medications:     albuterol sulfate  (90 Base) MCG/ACT inhaler, Inhale 2 puffs Every 4 (Four) Hours As Needed for Wheezing or Shortness of Air., Disp: , Rfl:     hydrOXYzine (ATARAX) 10 MG tablet, Take 1 tablet by mouth 3 (Three) Times a Day As Needed for Anxiety (insomnia)., Disp: 90 tablet, Rfl: 0    Viloxazine HCl ER (Qelbree) 100 MG capsule sustained-release 24 hr, Take 1 capsule by mouth Daily., Disp: 14 capsule, Rfl: 0    Viloxazine HCl  ER (Qelbree) 100 MG capsule sustained-release 24 hr, Take 1 capsule by mouth Daily., Disp: 30 capsule, Rfl: 2    Allergies:   No Known Allergies    Results Reviewed: n/a     The following portion of the patient's history were reviewed and updated appropriately: allergies, current and past medications, family history, medical history and social history.    Objective     Mental Status Exam:  MENTAL STATUS EXAM   General Appearance:  Cleanly groomed and dressed  Eye Contact:  Good eye contact  Attitude:  Cooperative  Motor Activity:  Normal gait, posture and fidgety  Muscle Strength:  Normal  Speech:  Normal rate, tone, volume  Language:  Spontaneous  Mood and affect:  Normal, pleasant and blunted  Hopelessness:  Denies  Loneliness: Denies  Thought Process:  Logical  Associations/ Thought Content:  No delusions  Hallucinations:  None  Suicidal Ideations:  Not present  Homicidal Ideation:  Not present  Sensorium:  Alert and clear  Orientation:  Person, place, time and situation  Immediate Recall, Recent, and Remote Memory:  Intact  Attention Span/ Concentration:  Easily distracted  Fund of Knowledge:  Appropriate for age and educational level  Intellectual Functioning:  Average range  Insight:  Good  Judgement:  Good  Reliability:  Good  Impulse Control:  Good      SUICIDE RISK ASSESSMENT/CSSRS:  1. Does patient have thoughts of suicide? no  2. Does patient have intent for suicide? no  3. Does patient have a current plan for suicide? no  4. History of suicide attempts: no  5. Family history of suicide or attempts: no  6. History of violent behaviors towards others or property or thoughts of committing suicide: no  7. History of sexual aggression toward others: no  8. Access to firearms or weapons: no    Labs Reviewed: n/a  UDS Reviewed: 8/22/24, results as expected  Chart since last visit reviewed: [email or web address]    Assessment / Plan    Quality Measures:  Tobacco Cessation: Patient denies tobacco use. No tobacco cessation  "education necessary.      Depression (PHQ >9): Patient screened negative this visit with a PHQ score < 9. Will continue to monitor screening scores and provide supportive care.   Medication education:   Benzo: N/A  Stimulants: CSA up to date and on file      Risk Assessment: Risk of self-harm acutely is low. Risk of self-harm chronically is also low, but could be further elevated in the event of treatment noncompliance and/or AODA.    20 minutes were spent reviewing the patient's questionnaire, formulating a treatment plan, and relaying information to the patient via [employer].    Assessment/Plan:   Diagnoses and all orders for this visit:    1. ADHD (attention deficit hyperactivity disorder), inattentive type (Primary)  -     Viloxazine HCl ER (Qelbree) 100 MG capsule sustained-release 24 hr; Take 1 capsule by mouth Daily.  Dispense: 14 capsule; Refill: 0  -     Viloxazine HCl ER (Qelbree) 100 MG capsule sustained-release 24 hr; Take 1 capsule by mouth Daily.  Dispense: 30 capsule; Refill: 2       Assessment & Plan  1. Attention Deficit Hyperactivity Disorder (ADHD).  She has been experiencing side effects such as nausea and excessive sleepiness on Strattera 40 mg, leading to a reduction back to 25 mg. Despite the reduction, she continues to experience significant side effects, including gagging on the medication and persistent nausea. The potential benefits and side effects of Strattera were discussed, and it was noted that she has been on the pediatric dose since December, which may require additional time for full effect. Given her intolerance to Strattera, an alternative medication, Qelbree, was proposed. Qelbree comes in a capsule that can be opened and sprinkled on food, which may help with her gagging issue. The potential side effects, including sleepiness, were discussed, and it was recommended to take the medication at night. She was also advised to read the book \"ADHD for Smart A** Women\" by Teressa Pagan, " which provides a comprehensive approach to managing ADHD, particularly in women. A prescription for Qelbree 100 mg was provided, and she was given a two-week supply of samples to start at the pediatric dose. She was instructed to discontinue Strattera at her discretion, as she has not been on it long enough to require a significant tapering process.    Follow-up  The patient will follow up in 6 weeks.    Impression/Formulation:  Patient appeared alert and oriented.  Patient is voluntarily contiuing psychiatric care at [facility].  Patient is receptive to assistance with maintaining a stable lifestyle.  Patient presents with history of     ICD-10-CM ICD-9-CM   1. ADHD (attention deficit hyperactivity disorder), inattentive type  F90.0 314.00   .     Any medications prescribed have been sent electronically to [facility] in [place].    Patient will continue supportive psychotherapy efforts and medications as indicated. Discussed medication options and treatment plan of prescribed medication(s) as well as the risks, benefits, and potential side effects. Patient ackowledged and verbally consented to continue with current treatment plan and was educated on the importance of compliance with treatment and follow-up appointments. Patient seems reasonably able to adhere to treatment plan.      Assisted Patient in identifying risk factors which would indicate the need for higher level of care including thoughts to harm self or others and/or self-harming behavior and encouraged Patient to contact this office, call 911, or present to the nearest emergency room should any of these events occur. Discussed crisis intervention services and means to access. Clinic will obtain release of information for current treatment team for continuity of care as needed. Patient adamantly and convincingly denies current suicidal or homicidal ideation or perceptual disturbance.       Follow Up:   Return in about 6 weeks  (around 3/31/2025) for Medication Management.        ANANYA Cruz  [facility]    This is electronically signed by ANANYA Cruz  02/17/2025 09:29 EST

## 2025-03-31 ENCOUNTER — TELEMEDICINE (OUTPATIENT)
Age: 25
End: 2025-03-31

## 2025-03-31 DIAGNOSIS — F90.0 ADHD (ATTENTION DEFICIT HYPERACTIVITY DISORDER), INATTENTIVE TYPE: Primary | ICD-10-CM

## 2025-03-31 PROCEDURE — 99214 OFFICE O/P EST MOD 30 MIN: CPT

## 2025-03-31 PROCEDURE — 96127 BRIEF EMOTIONAL/BEHAV ASSMT: CPT

## 2025-03-31 RX ORDER — DEXTROAMPHETAMINE SACCHARATE, AMPHETAMINE ASPARTATE, DEXTROAMPHETAMINE SULFATE AND AMPHETAMINE SULFATE 2.5; 2.5; 2.5; 2.5 MG/1; MG/1; MG/1; MG/1
10 TABLET ORAL 2 TIMES DAILY
Qty: 60 TABLET | Refills: 0 | Status: SHIPPED | OUTPATIENT
Start: 2025-03-31

## 2025-06-11 ENCOUNTER — TELEPHONE (OUTPATIENT)
Dept: INTERNAL MEDICINE | Facility: CLINIC | Age: 25
End: 2025-06-11
Payer: COMMERCIAL

## 2025-06-11 DIAGNOSIS — F51.04 PSYCHOPHYSIOLOGICAL INSOMNIA: ICD-10-CM

## 2025-06-11 DIAGNOSIS — F90.0 ADHD (ATTENTION DEFICIT HYPERACTIVITY DISORDER), INATTENTIVE TYPE: Primary | ICD-10-CM

## 2025-06-11 NOTE — TELEPHONE ENCOUNTER
PT CALLED AND ASK THAT YOU GIVE HER A CALL SHE NEEDS TO TALK TO YOU ABOUT HER ADDERALL AND HYDROXYZINE. PLEASE CALL PT TODAY.

## 2025-06-17 NOTE — TELEPHONE ENCOUNTER
Patient is asking for an increase to 15MG of the adderall.  She asked if you could call her as soon as possible.  Phone number verified.

## 2025-06-18 DIAGNOSIS — F90.0 ADHD (ATTENTION DEFICIT HYPERACTIVITY DISORDER), INATTENTIVE TYPE: ICD-10-CM

## 2025-06-18 RX ORDER — DEXTROAMPHETAMINE SACCHARATE, AMPHETAMINE ASPARTATE, DEXTROAMPHETAMINE SULFATE AND AMPHETAMINE SULFATE 2.5; 2.5; 2.5; 2.5 MG/1; MG/1; MG/1; MG/1
10 TABLET ORAL 2 TIMES DAILY
Qty: 60 TABLET | Refills: 0 | Status: CANCELLED | OUTPATIENT
Start: 2025-06-18

## 2025-06-18 RX ORDER — DEXTROAMPHETAMINE SACCHARATE, AMPHETAMINE ASPARTATE, DEXTROAMPHETAMINE SULFATE AND AMPHETAMINE SULFATE 3.75; 3.75; 3.75; 3.75 MG/1; MG/1; MG/1; MG/1
15 TABLET ORAL 2 TIMES DAILY
Qty: 26 TABLET | Refills: 0 | Status: SHIPPED | OUTPATIENT
Start: 2025-06-18 | End: 2025-07-01

## 2025-06-18 RX ORDER — HYDROXYZINE HYDROCHLORIDE 10 MG/1
10 TABLET, FILM COATED ORAL 3 TIMES DAILY PRN
Qty: 90 TABLET | Refills: 1 | Status: SHIPPED | OUTPATIENT
Start: 2025-06-18

## 2025-06-18 NOTE — TELEPHONE ENCOUNTER
Called patient back, per patient request; dose adjusted and prescription sent on other encounter today.

## 2025-06-18 NOTE — TELEPHONE ENCOUNTER
Incoming Refill Request      Medication requested (name and dose): ADDERALL 10MG    Pharmacy where request should be sent: [facility]    Additional details provided by patient: PT ASK THAT YOU ONLY SEND IN 13 DAYS WORTH FOR SHE HAS TO PAY CASH FOR IT     Best call back number: [phone]    Does the patient have less than a 3 day supply:  [x] Yes  [] No    Alice Huitron Rep  06/18/25, 13:47 EDT

## 2025-07-01 ENCOUNTER — OFFICE VISIT (OUTPATIENT)
Dept: BEHAVIORAL HEALTH | Facility: CLINIC | Age: 25
End: 2025-07-01

## 2025-07-01 VITALS — HEIGHT: 63 IN | WEIGHT: 126.7 LBS | BODY MASS INDEX: 22.45 KG/M2

## 2025-07-01 DIAGNOSIS — F90.0 ADHD (ATTENTION DEFICIT HYPERACTIVITY DISORDER), INATTENTIVE TYPE: Primary | ICD-10-CM

## 2025-07-01 RX ORDER — DEXTROAMPHETAMINE SULFATE 10 MG/1
10 TABLET ORAL 2 TIMES DAILY
Qty: 14 TABLET | Refills: 0 | Status: SHIPPED | OUTPATIENT
Start: 2025-07-01 | End: 2025-07-03 | Stop reason: SDUPTHER

## 2025-07-01 NOTE — PROGRESS NOTES
Follow Up Office Visit      Date: 2025   Patient Name: Grace Whittaker  : 2000   MRN: [id number]     Patient or patient representative verbalized consent for the use of Ambient Listening during the visit with  ANANYA Cruz for chart documentation. 7/3/2025  17:45 EDT    Chief Complaint:  Grace Whittaker is a 24 y.o. female who is here today for follow up with medication management for ADHD.    History of Present Illness  Patient reports that the Adderall has been having an effect, though there is room for improvement. The medication helps her focus, although sometimes not on the right things.  She reports experiencing digestive issues and occasional shakiness, which she attributes to her Adderall medication. Typically, she takes her first dose around 5:00 AM and the second dose at noon. If she forgets the noon dose, she feels the effects wearing off by 2:00 PM. Difficulty sleeping occurs when she takes the medication at noon, so she has been trying to take it around 11:00 AM instead. Today, she has not taken her second dose as she did not sleep in. Occasionally, she experiences a sensation of energy coursing through her arms or body, akin to shakiness, but without visible shaking.     Psychiatric History:   - Considering diagnosis of autism but plans to wait until she has insurance coverage  - Suspects mild anxiety but does not believe it requires medication    Pertinent Negatives:   - No visible shaking despite sensation of shakiness  - No need for anxiety medication as she does not notice it    Subjective     Review of Systems:   Review of Systems   Psychiatric/Behavioral:  Positive for decreased concentration. The patient is nervous/anxious.        Screening Scores:   PHQ-9: 6   DAVID-7: 3     Medications:     Current Outpatient Medications:     albuterol sulfate  (90 Base) MCG/ACT inhaler, Inhale 2 puffs Every 4 (Four) Hours As Needed for Wheezing or Shortness of Air.,  "Disp: , Rfl:     hydrOXYzine (ATARAX) 10 MG tablet, Take 1 tablet by mouth 3 (Three) Times a Day As Needed for Anxiety (insomnia)., Disp: 90 tablet, Rfl: 1    dextroamphetamine (DEXTROSTAT) 10 MG tablet, Take 1 tablet by mouth 2 (Two) Times a Day., Disp: 14 tablet, Rfl: 0    Allergies:   No Known Allergies    Results Reviewed: n/a     The following portion of the patient's history were reviewed and updated appropriately: allergies, current and past medications, family history, medical history and social history.    Objective     Vital Signs:   Vitals:    07/01/25 1718   Weight: 57.5 kg (126 lb 11.2 oz)   Height: 160 cm (62.99\")     Body mass index is 22.45 kg/m².     Mental Status Exam:   MENTAL STATUS EXAM   General Appearance:  Cleanly groomed and dressed  Eye Contact:  Good eye contact  Attitude:  Cooperative  Motor Activity:  Normal gait, posture and fidgety  Muscle Strength:  Normal  Speech:  Normal rate, tone, volume  Language:  Spontaneous  Mood and affect:  Normal, pleasant and blunted  Hopelessness:  Denies  Loneliness: Denies  Thought Process:  Logical  Associations/ Thought Content:  No delusions  Hallucinations:  None  Suicidal Ideations:  Not present  Homicidal Ideation:  Not present  Sensorium:  Alert and clear  Orientation:  Person, place, time and situation  Immediate Recall, Recent, and Remote Memory:  Intact  Attention Span/ Concentration:  Easily distracted  Fund of Knowledge:  Appropriate for age and educational level  Intellectual Functioning:  Average range  Insight:  Good  Judgement:  Good  Reliability:  Good  Impulse Control:  Good        SUICIDE RISK ASSESSMENT/CSSRS:  1. Does patient have thoughts of suicide? no  2. Does patient have intent for suicide? no  3. Does patient have a current plan for suicide? no  4. History of suicide attempts: no  5. Family history of suicide or attempts: no  6. History of violent behaviors towards others or property or thoughts of committing suicide: no  7. " History of sexual aggression toward others: no  8. Access to firearms or weapons: no    Labs Reviewed: n/a  UDS Reviewed: 8/22/24, results as expected  Chart since last visit reviewed: yes    Assessment / Plan    Quality Measures:  Tobacco cessation: Patient denies tobacco use. No tobacco cessation education necessary.     Depression (PHQ >9): Patient screened negative this visit with a PHQ score < 9. Will continue to monitor screening scores and provide supportive care.     Medication Considerations:  Benzo: n/a  Stimulants: CSA up to date and on file    ANTHONY reviewed and appropriate.     Risk Assessment: Risk of self-harm acutely is low. Risk of self-harm chronically is also low, but could be further elevated in the event of treatment noncompliance and/or AODA.    Impression/Formulation:  Patient appeared alert and oriented.  Patient is voluntarily seeking psychiatric care at [facility].  Patient is receptive to assistance with maintaining a stable lifestyle.  Conditions being treated include     ICD-10-CM ICD-9-CM   1. ADHD (attention deficit hyperactivity disorder), inattentive type  F90.0 314.00   .     Visit Diagnosis/Orders Placed This Visit:  Diagnoses and all orders for this visit:    1. ADHD (attention deficit hyperactivity disorder), inattentive type (Primary)  -     dextroamphetamine (DEXTROSTAT) 10 MG tablet; Take 1 tablet by mouth 2 (Two) Times a Day.  Dispense: 14 tablet; Refill: 0       Assessment & Plan  Content of Therapy:  During the session, the patient discussed her experience with Adderall, including digestive issues and occasional shakiness. She described her dosing schedule and the effects on her sleep and focus. The patient also mentioned her work as a  and her ability to afford medication. The possibility of an autism diagnosis was explored, with plans to pursue testing once she has insurance coverage. The patient acknowledged potential mild  anxiety but does not feel it requires medication. She has started practicing yoga to manage stress and anxiety. Previous experiences with Vyvanse, Ritalin, and Wellbutrin were reviewed.    Clinical Impression:  The patient appears to be managing her ADHD symptoms with Adderall, although she experiences occasional digestive issues and shakiness. She reports that the medication helps her focus, but sometimes not on the intended tasks. She is proactive in adjusting her dosing schedule to mitigate sleep disturbances. The patient is considering further evaluation for autism and acknowledges mild anxiety, which she does not feel requires medication. Her engagement in yoga suggests a positive approach to managing stress and anxiety.    Therapeutic Intervention:  - Discussion of medication management for ADHD, including the potential switch from Adderall to Dexedrine.  - Exploration of the patient's experiences with various medications and their side effects.  - Encouragement to continue yoga practice for stress and anxiety management.    Plan:  - Prescribe Dexedrine 10 mg, to be taken twice daily.  - If Dexedrine is cost-prohibitive or unavailable, contact the office for an alternative prescription of Adderall 15 mg, to be taken twice daily.  - Continue yoga practice for stress and anxiety management.    Follow-up:  - Schedule a follow-up appointment in 3 months.  - Patient to contact the office for medication adjustments as needed.    Notes & Risk Factors:  - Patient reports occasional digestive issues and shakiness with Adderall.  - Patient suspects mild anxiety but does not feel it requires medication.  - Protective factor: Engagement in yoga for stress management.    Any medications prescribed have been sent electronically to [facility] in [place].      Patient will continue supportive psychotherapy efforts and medications as indicated.  Discussed medication options and treatment plan of prescribed medication(s) as well  as the risks, benefits, and potential side effects. Patient will contact this office, call 911 or present to the nearest emergency room should suicidal or homicidal ideations occur. Clinic will obtain release of information for current treatment team for continuity of care as needed. Patient ackowledged and verbally consented to continue with current treatment plan and was educated on the importance of compliance with treatment and follow-up appointments.           ANANYA Perrin  [facility]    This is electronically signed by ANANYA Perrin  07/01/2025 10:24 EDT

## 2025-07-03 DIAGNOSIS — F90.0 ADHD (ATTENTION DEFICIT HYPERACTIVITY DISORDER), INATTENTIVE TYPE: ICD-10-CM

## 2025-07-03 RX ORDER — DEXTROAMPHETAMINE SULFATE 10 MG/1
10 TABLET ORAL 2 TIMES DAILY
Qty: 14 TABLET | Refills: 0 | Status: SHIPPED | OUTPATIENT
Start: 2025-07-03

## 2025-07-03 NOTE — TELEPHONE ENCOUNTER
Incoming Refill Request      Medication requested (name and dose): dextroamphetamine    Pharmacy where request should be sent: [facility]    Additional details provided by patient: patient would like [facility] prescrition cancelled and this script sent to [facility]     Best call back number: [phone]     Does the patient have less than a 3 day supply:  [x] Yes  [] No    Alessia Tamez  07/03/25, 09:41 EDT

## 2025-07-09 DIAGNOSIS — F90.0 ADHD (ATTENTION DEFICIT HYPERACTIVITY DISORDER), INATTENTIVE TYPE: ICD-10-CM

## 2025-07-09 RX ORDER — DEXTROAMPHETAMINE SULFATE 10 MG/1
10 TABLET ORAL 2 TIMES DAILY
Qty: 60 TABLET | Refills: 0 | Status: SHIPPED | OUTPATIENT
Start: 2025-07-09

## 2025-07-09 NOTE — TELEPHONE ENCOUNTER
Rx Refill Note  Requested Prescriptions     Pending Prescriptions Disp Refills    dextroamphetamine (DEXTROSTAT) 10 MG tablet 14 tablet 0     Sig: Take 1 tablet by mouth 2 (Two) Times a Day.      Last office visit with prescribing clinician: 7/1/2025   Last telemedicine visit with prescribing clinician: Visit date not found   Next office visit with prescribing clinician: 9/30/2025                         Would you like a call back once the refill request has been completed: [] Yes [] No    If the office needs to give you a call back, can they leave a voicemail: [] Yes [] No    Doris Woodson MA  07/09/25, 11:23 EDT

## 2025-08-14 DIAGNOSIS — F90.0 ADHD (ATTENTION DEFICIT HYPERACTIVITY DISORDER), INATTENTIVE TYPE: ICD-10-CM

## 2025-08-14 RX ORDER — DEXTROAMPHETAMINE SULFATE 15 MG/1
15 TABLET ORAL 2 TIMES DAILY
Qty: 60 TABLET | Refills: 0 | Status: SHIPPED | OUTPATIENT
Start: 2025-08-14

## 2025-08-16 DIAGNOSIS — F90.0 ADHD (ATTENTION DEFICIT HYPERACTIVITY DISORDER), INATTENTIVE TYPE: ICD-10-CM

## 2025-08-16 RX ORDER — DEXTROAMPHETAMINE SULFATE 15 MG/1
15 TABLET ORAL 2 TIMES DAILY
Qty: 60 TABLET | Refills: 0 | Status: CANCELLED | OUTPATIENT
Start: 2025-08-16

## 2025-08-20 RX ORDER — DEXTROAMPHETAMINE SULFATE 10 MG/1
10 CAPSULE, EXTENDED RELEASE ORAL DAILY
Qty: 30 CAPSULE | Refills: 0 | Status: SHIPPED | OUTPATIENT
Start: 2025-08-20